# Patient Record
Sex: FEMALE | Race: OTHER | NOT HISPANIC OR LATINO
[De-identification: names, ages, dates, MRNs, and addresses within clinical notes are randomized per-mention and may not be internally consistent; named-entity substitution may affect disease eponyms.]

---

## 2017-01-26 ENCOUNTER — RESULT REVIEW (OUTPATIENT)
Age: 37
End: 2017-01-26

## 2018-06-29 ENCOUNTER — RESULT REVIEW (OUTPATIENT)
Age: 38
End: 2018-06-29

## 2019-01-28 RX ORDER — ERGOCALCIFEROL 1.25 MG/1
1 CAPSULE ORAL
Qty: 0 | Refills: 0 | COMMUNITY
Start: 2019-01-28

## 2019-01-29 ENCOUNTER — INPATIENT (INPATIENT)
Facility: HOSPITAL | Age: 39
LOS: 0 days | Discharge: HOME | End: 2019-01-30
Attending: HOSPITALIST | Admitting: HOSPITALIST
Payer: MEDICAID

## 2019-01-29 VITALS
TEMPERATURE: 98 F | OXYGEN SATURATION: 100 % | SYSTOLIC BLOOD PRESSURE: 111 MMHG | DIASTOLIC BLOOD PRESSURE: 63 MMHG | RESPIRATION RATE: 18 BRPM | HEART RATE: 88 BPM

## 2019-01-29 DIAGNOSIS — Z29.9 ENCOUNTER FOR PROPHYLACTIC MEASURES, UNSPECIFIED: ICD-10-CM

## 2019-01-29 DIAGNOSIS — R09.89 OTHER SPECIFIED SYMPTOMS AND SIGNS INVOLVING THE CIRCULATORY AND RESPIRATORY SYSTEMS: ICD-10-CM

## 2019-01-29 DIAGNOSIS — K59.00 CONSTIPATION, UNSPECIFIED: ICD-10-CM

## 2019-01-29 DIAGNOSIS — R05 COUGH: ICD-10-CM

## 2019-01-29 DIAGNOSIS — Z98.891 HISTORY OF UTERINE SCAR FROM PREVIOUS SURGERY: Chronic | ICD-10-CM

## 2019-01-29 DIAGNOSIS — M79.606 PAIN IN LEG, UNSPECIFIED: ICD-10-CM

## 2019-01-29 LAB
ALBUMIN SERPL ELPH-MCNC: 4 G/DL — SIGNIFICANT CHANGE UP (ref 3.3–5)
ALP SERPL-CCNC: 89 U/L — SIGNIFICANT CHANGE UP (ref 40–120)
ALT FLD-CCNC: 19 U/L — SIGNIFICANT CHANGE UP (ref 4–33)
ANION GAP SERPL CALC-SCNC: 15 MMO/L — HIGH (ref 7–14)
ANISOCYTOSIS BLD QL: SLIGHT — SIGNIFICANT CHANGE UP
APPEARANCE UR: CLEAR — SIGNIFICANT CHANGE UP
AST SERPL-CCNC: 16 U/L — SIGNIFICANT CHANGE UP (ref 4–32)
BASE EXCESS BLDV CALC-SCNC: 5 MMOL/L — SIGNIFICANT CHANGE UP
BASOPHILS # BLD AUTO: 0.04 K/UL — SIGNIFICANT CHANGE UP (ref 0–0.2)
BASOPHILS NFR BLD AUTO: 0.2 % — SIGNIFICANT CHANGE UP (ref 0–2)
BASOPHILS NFR SPEC: 1.7 % — SIGNIFICANT CHANGE UP (ref 0–2)
BILIRUB SERPL-MCNC: 0.6 MG/DL — SIGNIFICANT CHANGE UP (ref 0.2–1.2)
BILIRUB UR-MCNC: NEGATIVE — SIGNIFICANT CHANGE UP
BLASTS # FLD: 0 % — SIGNIFICANT CHANGE UP (ref 0–0)
BLOOD GAS VENOUS - CREATININE: 0.56 MG/DL — SIGNIFICANT CHANGE UP (ref 0.5–1.3)
BLOOD UR QL VISUAL: NEGATIVE — SIGNIFICANT CHANGE UP
BUN SERPL-MCNC: 12 MG/DL — SIGNIFICANT CHANGE UP (ref 7–23)
CALCIUM SERPL-MCNC: 8.9 MG/DL — SIGNIFICANT CHANGE UP (ref 8.4–10.5)
CHLORIDE BLDV-SCNC: 109 MMOL/L — HIGH (ref 96–108)
CHLORIDE SERPL-SCNC: 101 MMOL/L — SIGNIFICANT CHANGE UP (ref 98–107)
CK SERPL-CCNC: 47 U/L — SIGNIFICANT CHANGE UP (ref 25–170)
CO2 SERPL-SCNC: 25 MMOL/L — SIGNIFICANT CHANGE UP (ref 22–31)
COLOR SPEC: SIGNIFICANT CHANGE UP
CREAT SERPL-MCNC: 0.75 MG/DL — SIGNIFICANT CHANGE UP (ref 0.5–1.3)
CRP SERPL-MCNC: < 4 MG/L — SIGNIFICANT CHANGE UP
EOSINOPHIL # BLD AUTO: 0.09 K/UL — SIGNIFICANT CHANGE UP (ref 0–0.5)
EOSINOPHIL NFR BLD AUTO: 0.6 % — SIGNIFICANT CHANGE UP (ref 0–6)
EOSINOPHIL NFR FLD: 0 % — SIGNIFICANT CHANGE UP (ref 0–6)
ERYTHROCYTE [SEDIMENTATION RATE] IN BLOOD: 14 MM/HR — SIGNIFICANT CHANGE UP (ref 4–25)
ERYTHROCYTE [SEDIMENTATION RATE] IN BLOOD: SIGNIFICANT CHANGE UP MM/HR (ref 4–25)
GAS PNL BLDV: 136 MMOL/L — SIGNIFICANT CHANGE UP (ref 136–146)
GLUCOSE BLDV-MCNC: 85 — SIGNIFICANT CHANGE UP (ref 70–99)
GLUCOSE SERPL-MCNC: 79 MG/DL — SIGNIFICANT CHANGE UP (ref 70–99)
GLUCOSE UR-MCNC: NEGATIVE — SIGNIFICANT CHANGE UP
HCG SERPL-ACNC: < 5 MIU/ML — SIGNIFICANT CHANGE UP
HCO3 BLDV-SCNC: 27 MMOL/L — SIGNIFICANT CHANGE UP (ref 20–27)
HCT VFR BLD CALC: 36.1 % — SIGNIFICANT CHANGE UP (ref 34.5–45)
HCT VFR BLDV CALC: 35 % — SIGNIFICANT CHANGE UP (ref 34.5–45)
HGB BLD-MCNC: 11.8 G/DL — SIGNIFICANT CHANGE UP (ref 11.5–15.5)
HGB BLDV-MCNC: 11.4 G/DL — LOW (ref 11.5–15.5)
HYPOCHROMIA BLD QL: SLIGHT — SIGNIFICANT CHANGE UP
IMM GRANULOCYTES NFR BLD AUTO: 0.7 % — SIGNIFICANT CHANGE UP (ref 0–1.5)
KETONES UR-MCNC: NEGATIVE — SIGNIFICANT CHANGE UP
LACTATE BLDV-MCNC: 2.1 MMOL/L — HIGH (ref 0.5–2)
LEUKOCYTE ESTERASE UR-ACNC: NEGATIVE — SIGNIFICANT CHANGE UP
LYMPHOCYTES # BLD AUTO: 43.4 % — SIGNIFICANT CHANGE UP (ref 13–44)
LYMPHOCYTES # BLD AUTO: 7.03 K/UL — HIGH (ref 1–3.3)
LYMPHOCYTES NFR SPEC AUTO: 34.5 % — SIGNIFICANT CHANGE UP (ref 13–44)
MCHC RBC-ENTMCNC: 28.1 PG — SIGNIFICANT CHANGE UP (ref 27–34)
MCHC RBC-ENTMCNC: 32.7 % — SIGNIFICANT CHANGE UP (ref 32–36)
MCV RBC AUTO: 86 FL — SIGNIFICANT CHANGE UP (ref 80–100)
METAMYELOCYTES # FLD: 0 % — SIGNIFICANT CHANGE UP (ref 0–1)
MONOCYTES # BLD AUTO: 1.34 K/UL — HIGH (ref 0–0.9)
MONOCYTES NFR BLD AUTO: 8.3 % — SIGNIFICANT CHANGE UP (ref 2–14)
MONOCYTES NFR BLD: 8.6 % — SIGNIFICANT CHANGE UP (ref 2–9)
MYELOCYTES NFR BLD: 0 % — SIGNIFICANT CHANGE UP (ref 0–0)
NEUTROPHIL AB SER-ACNC: 51.7 % — SIGNIFICANT CHANGE UP (ref 43–77)
NEUTROPHILS # BLD AUTO: 7.56 K/UL — HIGH (ref 1.8–7.4)
NEUTROPHILS NFR BLD AUTO: 46.8 % — SIGNIFICANT CHANGE UP (ref 43–77)
NEUTS BAND # BLD: 0 % — SIGNIFICANT CHANGE UP (ref 0–6)
NITRITE UR-MCNC: NEGATIVE — SIGNIFICANT CHANGE UP
NRBC # FLD: 0 K/UL — LOW (ref 25–125)
OTHER - HEMATOLOGY %: 0 — SIGNIFICANT CHANGE UP
PCO2 BLDV: 55 MMHG — HIGH (ref 41–51)
PH BLDV: 7.36 PH — SIGNIFICANT CHANGE UP (ref 7.32–7.43)
PH UR: 6 — SIGNIFICANT CHANGE UP (ref 5–8)
PLATELET # BLD AUTO: 400 K/UL — SIGNIFICANT CHANGE UP (ref 150–400)
PLATELET COUNT - ESTIMATE: NORMAL — SIGNIFICANT CHANGE UP
PMV BLD: 9.4 FL — SIGNIFICANT CHANGE UP (ref 7–13)
PO2 BLDV: 31 MMHG — LOW (ref 35–40)
POLYCHROMASIA BLD QL SMEAR: SLIGHT — SIGNIFICANT CHANGE UP
POTASSIUM BLDV-SCNC: 3.5 MMOL/L — SIGNIFICANT CHANGE UP (ref 3.4–4.5)
POTASSIUM SERPL-MCNC: 3.8 MMOL/L — SIGNIFICANT CHANGE UP (ref 3.5–5.3)
POTASSIUM SERPL-SCNC: 3.8 MMOL/L — SIGNIFICANT CHANGE UP (ref 3.5–5.3)
PROMYELOCYTES # FLD: 0 % — SIGNIFICANT CHANGE UP (ref 0–0)
PROT SERPL-MCNC: 7 G/DL — SIGNIFICANT CHANGE UP (ref 6–8.3)
PROT UR-MCNC: NEGATIVE — SIGNIFICANT CHANGE UP
RBC # BLD: 4.2 M/UL — SIGNIFICANT CHANGE UP (ref 3.8–5.2)
RBC # FLD: 13.6 % — SIGNIFICANT CHANGE UP (ref 10.3–14.5)
SAO2 % BLDV: 53 % — LOW (ref 60–85)
SODIUM SERPL-SCNC: 141 MMOL/L — SIGNIFICANT CHANGE UP (ref 135–145)
SP GR SPEC: 1.02 — SIGNIFICANT CHANGE UP (ref 1–1.04)
TSH SERPL-MCNC: 2.3 UIU/ML — SIGNIFICANT CHANGE UP (ref 0.27–4.2)
UROBILINOGEN FLD QL: NORMAL — SIGNIFICANT CHANGE UP
VARIANT LYMPHS # BLD: 3.5 % — SIGNIFICANT CHANGE UP
WBC # BLD: 16.18 K/UL — HIGH (ref 3.8–10.5)
WBC # FLD AUTO: 16.18 K/UL — HIGH (ref 3.8–10.5)

## 2019-01-29 PROCEDURE — 71045 X-RAY EXAM CHEST 1 VIEW: CPT | Mod: 26

## 2019-01-29 PROCEDURE — 99223 1ST HOSP IP/OBS HIGH 75: CPT

## 2019-01-29 RX ORDER — MORPHINE SULFATE 50 MG/1
4 CAPSULE, EXTENDED RELEASE ORAL ONCE
Qty: 0 | Refills: 0 | Status: DISCONTINUED | OUTPATIENT
Start: 2019-01-29 | End: 2019-01-29

## 2019-01-29 RX ORDER — SENNA PLUS 8.6 MG/1
2 TABLET ORAL ONCE
Qty: 0 | Refills: 0 | Status: COMPLETED | OUTPATIENT
Start: 2019-01-29 | End: 2019-01-29

## 2019-01-29 RX ORDER — DOCUSATE SODIUM 100 MG
100 CAPSULE ORAL ONCE
Qty: 0 | Refills: 0 | Status: COMPLETED | OUTPATIENT
Start: 2019-01-29 | End: 2019-01-29

## 2019-01-29 RX ORDER — SODIUM CHLORIDE 9 MG/ML
1000 INJECTION INTRAMUSCULAR; INTRAVENOUS; SUBCUTANEOUS
Qty: 0 | Refills: 0 | Status: DISCONTINUED | OUTPATIENT
Start: 2019-01-29 | End: 2019-01-30

## 2019-01-29 RX ORDER — LACTOBACILLUS ACIDOPHILUS 100MM CELL
1 CAPSULE ORAL EVERY 12 HOURS
Qty: 0 | Refills: 0 | Status: DISCONTINUED | OUTPATIENT
Start: 2019-01-29 | End: 2019-01-30

## 2019-01-29 RX ORDER — ENOXAPARIN SODIUM 100 MG/ML
40 INJECTION SUBCUTANEOUS DAILY
Qty: 0 | Refills: 0 | Status: DISCONTINUED | OUTPATIENT
Start: 2019-01-30 | End: 2019-01-30

## 2019-01-29 RX ORDER — SENNA PLUS 8.6 MG/1
2 TABLET ORAL AT BEDTIME
Qty: 0 | Refills: 0 | Status: DISCONTINUED | OUTPATIENT
Start: 2019-01-30 | End: 2019-01-30

## 2019-01-29 RX ORDER — MONTELUKAST 4 MG/1
1 TABLET, CHEWABLE ORAL
Qty: 0 | Refills: 0 | COMMUNITY

## 2019-01-29 RX ORDER — MORPHINE SULFATE 50 MG/1
4 CAPSULE, EXTENDED RELEASE ORAL EVERY 6 HOURS
Qty: 0 | Refills: 0 | Status: DISCONTINUED | OUTPATIENT
Start: 2019-01-29 | End: 2019-01-30

## 2019-01-29 RX ORDER — KETOROLAC TROMETHAMINE 30 MG/ML
15 SYRINGE (ML) INJECTION ONCE
Qty: 0 | Refills: 0 | Status: DISCONTINUED | OUTPATIENT
Start: 2019-01-29 | End: 2019-01-29

## 2019-01-29 RX ORDER — SODIUM CHLORIDE 9 MG/ML
1000 INJECTION INTRAMUSCULAR; INTRAVENOUS; SUBCUTANEOUS ONCE
Qty: 0 | Refills: 0 | Status: COMPLETED | OUTPATIENT
Start: 2019-01-29 | End: 2019-01-29

## 2019-01-29 RX ORDER — PANTOPRAZOLE SODIUM 20 MG/1
40 TABLET, DELAYED RELEASE ORAL ONCE
Qty: 0 | Refills: 0 | Status: COMPLETED | OUTPATIENT
Start: 2019-01-29 | End: 2019-01-29

## 2019-01-29 RX ORDER — DOCUSATE SODIUM 100 MG
100 CAPSULE ORAL THREE TIMES A DAY
Qty: 0 | Refills: 0 | Status: DISCONTINUED | OUTPATIENT
Start: 2019-01-30 | End: 2019-01-30

## 2019-01-29 RX ADMIN — MORPHINE SULFATE 4 MILLIGRAM(S): 50 CAPSULE, EXTENDED RELEASE ORAL at 18:37

## 2019-01-29 RX ADMIN — MORPHINE SULFATE 4 MILLIGRAM(S): 50 CAPSULE, EXTENDED RELEASE ORAL at 22:31

## 2019-01-29 RX ADMIN — MORPHINE SULFATE 4 MILLIGRAM(S): 50 CAPSULE, EXTENDED RELEASE ORAL at 18:08

## 2019-01-29 RX ADMIN — Medication 15 MILLIGRAM(S): at 16:39

## 2019-01-29 RX ADMIN — MORPHINE SULFATE 4 MILLIGRAM(S): 50 CAPSULE, EXTENDED RELEASE ORAL at 23:34

## 2019-01-29 RX ADMIN — SODIUM CHLORIDE 1000 MILLILITER(S): 9 INJECTION INTRAMUSCULAR; INTRAVENOUS; SUBCUTANEOUS at 19:29

## 2019-01-29 RX ADMIN — Medication 15 MILLIGRAM(S): at 18:37

## 2019-01-29 RX ADMIN — SODIUM CHLORIDE 75 MILLILITER(S): 9 INJECTION INTRAMUSCULAR; INTRAVENOUS; SUBCUTANEOUS at 22:31

## 2019-01-29 RX ADMIN — Medication 100 MILLIGRAM(S): at 23:34

## 2019-01-29 RX ADMIN — SODIUM CHLORIDE 1000 MILLILITER(S): 9 INJECTION INTRAMUSCULAR; INTRAVENOUS; SUBCUTANEOUS at 16:04

## 2019-01-29 RX ADMIN — SENNA PLUS 2 TABLET(S): 8.6 TABLET ORAL at 23:34

## 2019-01-29 RX ADMIN — PANTOPRAZOLE SODIUM 40 MILLIGRAM(S): 20 TABLET, DELAYED RELEASE ORAL at 22:15

## 2019-01-29 NOTE — ED PROVIDER NOTE - PROGRESS NOTE DETAILS
FRANCISCO Bedolla called for admission. EARL left with answering service. Awaiting callback. Spoke to hospitalist and due to inability to ambulate and pain control will admit to hospitalist Dr. Evans after inability to reach Dr. Bedolla x 2. Text paged MAR.   -Spencer Esposito PGY4 [facility]#[id number]

## 2019-01-29 NOTE — H&P ADULT - HISTORY OF PRESENT ILLNESS
37 y/o Female , + Obesity,   presenting with B/L  legs pain x yesterday. Pt states 2 weeks ago she developed cough with fever, was seen by her PMD and prescribed a prednisone taper and Tamiflu x 5 days. Pt stopped the taper as she was feeling better but then symptoms returned and she was re-started on prednisone (unknown amount), pt states she is now taking one tablet twice daily. Pt states last night she developed left thigh pain with radiation down her leg, a few hours later the pain was also in the right leg. Pt states is unable to ambulate without assistance and that she intermittently feels sharp pain in the neck and back. She reports a tingling sensation to her feet and lower legs. Pt states she no longer has fever but continues to have dry cough. Pt denies chills, abdominal pain, dizziness, chest pain, shortness of breath, difficulty breathing, recent travel or any other concerns. Pt C/O + HA, + N/V with cough, + Constipation, No diarrhea, Last BM today, NO SOB, no rash, no abdominal pain, no dysuria, no sore throat, No recent travel, every one is sick at home with Cold/URI symptoms, + fatigue, + weakness, Cough is DRY, + Leukocytosis as per Lab, RVP & chest X ray ordered by me pending, Pt S/P Toradol  15 mg IVP x 1 and Morphine 4 mg IVP x 2 tonight for pain, LMP 3 weeks ago, denies pregnancy, pt awake, A+O x 3, 37 y/o Female , + Obesity,   presenting with B/L  legs pain x yesterday. Pt states 2 weeks ago she developed cough with fever, was seen by her PMD and prescribed a prednisone taper and Tamiflu x 5 days. Pt stopped the taper as she was feeling better but then symptoms returned and she was re-started on prednisone (unknown amount), pt states she is now taking one tablet twice daily. Pt states last night she developed left thigh pain with radiation down her leg, a few hours later the pain was also in the right leg. Pt states is unable to ambulate without assistance and that she intermittently feels sharp pain in the neck and back. She reports a tingling sensation to her feet and lower legs. Pt states she no longer has fever but continues to have dry cough. Pt denies chills, abdominal pain, dizziness, chest pain, shortness of breath, difficulty breathing, recent travel or any other concerns. Pt C/O + HA, + N/V with cough, + Constipation, No diarrhea, Last BM today, NO SOB, no rash, no abdominal pain, no dysuria, no sore throat, No recent travel, every one is sick at home with Cold/URI symptoms, + fatigue, + weakness, Cough is DRY, + Leukocytosis as per Lab, RVP & chest X ray ordered by me pending, Pt S/P Toradol  15 mg IVP x 1 and Morphine 4 mg IVP x 2 tonight for pain, LMP 3 weeks ago, denies pregnancy, pt awake, A+O x 3,     Labs: ESR 14, Lactate 2.1, Na 141, K+ 3.8, BUN 12, Creatinine 0.75, Glucose 79, LFT Normal, CPK 47, HCG < 5, TSH 2.3, UA Neg., WBC 16.18, Hgb 11.8, platelet 400, CRP < 4,     Vitals: Tem 99.8 (Rectal), HR 93, /55, RR 17, 100% RA

## 2019-01-29 NOTE — ED PROVIDER NOTE - ATTENDING CONTRIBUTION TO CARE
ED Attending (Dr Hollingsworth): I have personally performed a face to face bedside history and physical examination of this patient.  I have discussed the case with the PA and  I have personally authored the following components: HPI, ROS, Physical Exam and MDM in addition to reviewing and revising the rest of the Provider Note. 38yF w/non contirb pmhx p/w severe BL thigh pain and tenderness. Pt is unable to ambulate or stand up without support on exam. non diabetic, but concern for myositis vs rhabdo.  Unlikely demyelinating process as full sensation and strength at present. Presentatino would be very atypical for radicular pain given severe mm tenderness, but would consider on ddx. Neuro intact. Will check cbc/cmp/ck/vbg, ua and culture. Will give fluids, analgesia and reassess. c/w assessment in Main ED.

## 2019-01-29 NOTE — ED PROVIDER NOTE - OBJECTIVE STATEMENT
38yF w/no pmhx presenting with b/l leg pain x yesterday. Pt states 2 weeks ago she developed cough with fever, was seen by her PMD and prescribed a prednisone taper. Pt stopped the taper as she was feeling better but then symptoms returned and she was re-started on prednisone (unknown amount), pt states she is now taking one tablet twice daily. Pt states last night she developed left thigh pain with radiation down her leg, a few hours later the pain was also in the left leg. 38yF w/no pmhx presenting with b/l leg pain x yesterday. Pt states 2 weeks ago she developed cough with fever, was seen by her PMD and prescribed a prednisone taper. Pt stopped the taper as she was feeling better but then symptoms returned and she was re-started on prednisone (unknown amount), pt states she is now taking one tablet twice daily. Pt states last night she developed left thigh pain with radiation down her leg, a few hours later the pain was also in the right leg. Pt states is unable to ambulate without assistance and that she intermittently feels sharp pain in the neck and back. She reports a tingling sensation to her feet and lower legs. Pt states she no longer has fever but continues to have dry cough. Pt denies chills, abdominal pain, n/v/d, headache, dizziness, chest pain, shortness of breath, difficulty breathing, recent travel or any other concerns. 38yF w/no known pmhx presenting with b/l leg pain starting yesterday. Pt states 2 weeks ago she developed cough with fever, was seen by her PMD and prescribed a prednisone taper and another medication. Pt stopped the taper as she was feeling better but then symptoms returned and she was re-started on prednisone (unknown amount), pt states she is now taking one tablet twice daily. Pt states last night she developed left thigh pain with radiation down her leg, a few hours later the pain was also in the right leg. Pt states is unable to ambulate without assistance and that she intermittently feels sharp pain in the neck and back. She reports a tingling sensation to her feet and lower legs. Pt states she no longer has fever but continues to have dry cough. Pt denies chills, abdominal pain, n/v/d, headache, dizziness, chest pain, shortness of breath, difficulty breathing, recent travel or any other concerns.  Uncomfortable appearing.    Initially evaluated in surge area, to be transferred to Main ED, verbal handoff provided by Dr. Hollingsworth to Dr. Kamara.

## 2019-01-29 NOTE — H&P ADULT - PROBLEM SELECTOR PLAN 1
R/O viral syndrome, RVP, B/L Venous Doppler legs, R/O DVT,   F/U CBC, CMP, Iron studies, ferritin,  Vit. B12, Folate,  Pain Control, IV Morphine PRN, colace, Senna, IVF NS @ 75 cc/hr x 24 HR, Bacid,   PT Consult,  Fall/aspiration precaution,

## 2019-01-29 NOTE — ED ADULT NURSE REASSESSMENT NOTE - NS ED NURSE REASSESS COMMENT FT1
report received from Ezio RN, pt A&Ox3, complaining of 10/10 pain in both thighs that shoots down to both R and L leg since 10 pm last night. pt has strong flexion and extension bilaterally, has an increase in pain when lifting legs, on prednisone for flu. pt denies SOB, CP, abd pain, n/v/d, numbness, tingling, syncope, dysuria, no difference in eating/drinking. pt states she is unable to ambulate due to pain. VS stable, will continue to monitor. report received from Ezio RN, pt A&Ox3, complaining of 10/10 pain in both thighs that shoots down to both R and L leg since 10 pm last night. pt has strong flexion and extension bilaterally, has an increase in pain when lifting legs, on prednisone for flu. pt denies SOB, CP, abd pain, n/v/d, numbness, tingling, syncope, dysuria, no difference in eating/drinking. pt states she is unable to ambulate due to pain, can normally ambulate. VS stable, will continue to monitor.

## 2019-01-29 NOTE — ED ADULT NURSE REASSESSMENT NOTE - NS ED NURSE REASSESS COMMENT FT1
PT A&Ox4, received from [facility] area with 22 G IV to R ARM and fluids infusing. Originally presented to ED with bilateral back pain radiating down bilateral legs, difficulty ambulating and unsteady gait. Pt states she was DX with flu started on prednisone taper symptoms in legs started last night is ambulatory at baseline but unable to ambulate without assistance today. Pt medicated as ordered, VS as noted. Call bell within reach, will continue to monitor for safety and comfort.

## 2019-01-29 NOTE — H&P ADULT - PROBLEM SELECTOR PLAN 2
Chest X ray, RVP, Blood Cultures, sputum culture, Urine Legionella,  F/U CBC, CMP, + Leukocytosis, pt has been on PO Prednisone at home,   R/O Viral syndrome, R/O Pneumonia, HIV Test,

## 2019-01-29 NOTE — ED ADULT TRIAGE NOTE - CHIEF COMPLAINT QUOTE
Pt c/o B/L leg pains, described as "shooting pains" since last night. Unable to ambulate due to pain. Pt was dx with flu and sent home promethazine and prednisone. States B/L legs are tingling and weak. Denies pmhx.

## 2019-01-29 NOTE — H&P ADULT - ASSESSMENT
37 y/o Female , + Obesity,   presenting with B/L  legs pain x yesterday. Pt states 2 weeks ago she developed cough with fever, was seen by her PMD and prescribed a prednisone taper and Tamiflu x 5 days. Pt stopped the taper as she was feeling better but then symptoms returned and she was re-started on prednisone (unknown amount), pt states she is now taking one tablet twice daily. Pt states last night she developed left thigh pain with radiation down her leg, a few hours later the pain was also in the right leg. Pt states is unable to ambulate without assistance and that she intermittently feels sharp pain in the neck and back. She reports a tingling sensation to her feet and lower legs. Pt states she no longer has fever but continues to have dry cough. Pt denies chills, abdominal pain, dizziness, chest pain, shortness of breath, difficulty breathing, recent travel or any other concerns. Pt C/O + HA, + N/V with cough, + Constipation, No diarrhea, Last BM today, NO SOB, no rash, no abdominal pain, no dysuria, no sore throat, No recent travel, every one is sick at home with Cold/URI symptoms, + fatigue, + weakness, Cough is DRY, + Leukocytosis as per Lab, RVP & chest X ray ordered by me pending, Pt S/P Toradol  15 mg IVP x 1 and Morphine 4 mg IVP x 2 tonight for pain, LMP 3 weeks ago, denies pregnancy, pt awake, A+O x 3,

## 2019-01-29 NOTE — ED PROVIDER NOTE - MEDICAL DECISION MAKING DETAILS
38yF w/no pmhx p/w b/l leg pain that began last night in the setting of prednisone use for recent cold like symptoms. Pt is unable to ambulate or stand up without support on exam. Concern for myositis vs body aches from viral syndrome. Will check cbc/cmp/ck/vbg, ua and culture. Will give fluids, analgesia and reassess. 38yF w/non contirb pmhx p/w severe BL thigh pain and tenderness. Pt is unable to ambulate or stand up without support on exam. non diabetic, but concern for myositis vs rhabdo.  Unlikely demyelinating process as full sensation and strength at present. Presentatino would be very atypical for radicular pain given severe mm tenderness, but would consider on ddx. Neuro intact. Will check cbc/cmp/ck/vbg, ua and culture. Will give fluids, analgesia and reassess. c/w assessment in Main ED.

## 2019-01-29 NOTE — H&P ADULT - PROBLEM SELECTOR PLAN 4
DVT Prophylaxis: SQ Lovenox, PT, PTT, INR,    Hep A,B,C  profile,   PT consult, DVT Prophylaxis: SQ Lovenox, PT, PTT, INR,    Hep A,B,C  profile, Fasting lipid, HgbA1c,   PT consult,

## 2019-01-29 NOTE — ED PROVIDER NOTE - PHYSICAL EXAMINATION
pt appears in mild distress due to pain, leaning onto edge of exam table  MSK: +TTP along thighs b/l, non-tender calfs, limited ROM of lower extremities 2/2 pain, s  Neuro: A&Ox3, CN II-VII intact, sensation intact and equal b/l in all extremities pt appears in moderate distress due to pain, leaning onto edge of exam table  MSK: +TTP at both thighs b/l, even with mild contact, non-tender calves and joints, limited active ROM of lower extremities 2/2 pain, full passive ROM at all joints. Mild TTP C and L spine.   Neuro: A&Ox3, CN II-VII intact, sensation intact and equal b/l in all extremities, strength appears intact but effort limited by pain.     Can stand assisted, but feels severe pain in both thighs, no change in back pain - which is intermittent and mild. Unable to get up from chair on her own.

## 2019-01-29 NOTE — ED ADULT NURSE NOTE - OBJECTIVE STATEMENT
Pt presents to [facility], room 4, A&OX3, ambulatory at baseline without assistance, here for evaluation of bilateral leg pain that started last night. pt was started on a prednisone taper two weeks ago after seeing her pmd for sob/cold symptoms. Denies any dizziness, nausea, vomiting, shortness of breath, palpitations, diarrhea, fever, constipation, or chills. no falls or trauma. no bowel or bladder incontinence . IV established in right forearm with a 22g, labs drawn and sent, call bell in reach, side rails up, bed in locked position, md evaluation in progress, will continue to monitor.

## 2019-01-30 ENCOUNTER — TRANSCRIPTION ENCOUNTER (OUTPATIENT)
Age: 39
End: 2019-01-30

## 2019-01-30 VITALS
RESPIRATION RATE: 18 BRPM | TEMPERATURE: 99 F | SYSTOLIC BLOOD PRESSURE: 120 MMHG | HEART RATE: 74 BPM | OXYGEN SATURATION: 99 % | DIASTOLIC BLOOD PRESSURE: 65 MMHG

## 2019-01-30 DIAGNOSIS — E78.1 PURE HYPERGLYCERIDEMIA: ICD-10-CM

## 2019-01-30 LAB
ALBUMIN SERPL ELPH-MCNC: 3.6 G/DL — SIGNIFICANT CHANGE UP (ref 3.3–5)
ALP SERPL-CCNC: 82 U/L — SIGNIFICANT CHANGE UP (ref 40–120)
ALT FLD-CCNC: 16 U/L — SIGNIFICANT CHANGE UP (ref 4–33)
ANION GAP SERPL CALC-SCNC: 10 MMO/L — SIGNIFICANT CHANGE UP (ref 7–14)
AST SERPL-CCNC: 13 U/L — SIGNIFICANT CHANGE UP (ref 4–32)
B PERT DNA SPEC QL NAA+PROBE: NOT DETECTED — SIGNIFICANT CHANGE UP
BASOPHILS # BLD AUTO: 0.01 K/UL — SIGNIFICANT CHANGE UP (ref 0–0.2)
BASOPHILS NFR BLD AUTO: 0.1 % — SIGNIFICANT CHANGE UP (ref 0–2)
BILIRUB SERPL-MCNC: 0.8 MG/DL — SIGNIFICANT CHANGE UP (ref 0.2–1.2)
BUN SERPL-MCNC: 12 MG/DL — SIGNIFICANT CHANGE UP (ref 7–23)
C PNEUM DNA SPEC QL NAA+PROBE: NOT DETECTED — SIGNIFICANT CHANGE UP
CALCIUM SERPL-MCNC: 8.5 MG/DL — SIGNIFICANT CHANGE UP (ref 8.4–10.5)
CHLORIDE SERPL-SCNC: 102 MMOL/L — SIGNIFICANT CHANGE UP (ref 98–107)
CHOLEST SERPL-MCNC: 153 MG/DL — SIGNIFICANT CHANGE UP (ref 120–199)
CO2 SERPL-SCNC: 25 MMOL/L — SIGNIFICANT CHANGE UP (ref 22–31)
CREAT SERPL-MCNC: 0.68 MG/DL — SIGNIFICANT CHANGE UP (ref 0.5–1.3)
EOSINOPHIL # BLD AUTO: 0.19 K/UL — SIGNIFICANT CHANGE UP (ref 0–0.5)
EOSINOPHIL NFR BLD AUTO: 1.6 % — SIGNIFICANT CHANGE UP (ref 0–6)
FERRITIN SERPL-MCNC: 63.84 NG/ML — SIGNIFICANT CHANGE UP (ref 15–150)
FLUAV H1 2009 PAND RNA SPEC QL NAA+PROBE: NOT DETECTED — SIGNIFICANT CHANGE UP
FLUAV H1 RNA SPEC QL NAA+PROBE: NOT DETECTED — SIGNIFICANT CHANGE UP
FLUAV H3 RNA SPEC QL NAA+PROBE: NOT DETECTED — SIGNIFICANT CHANGE UP
FLUAV SUBTYP SPEC NAA+PROBE: NOT DETECTED — SIGNIFICANT CHANGE UP
FLUBV RNA SPEC QL NAA+PROBE: NOT DETECTED — SIGNIFICANT CHANGE UP
FOLATE SERPL-MCNC: 7.2 NG/ML — SIGNIFICANT CHANGE UP (ref 4.7–20)
GLUCOSE SERPL-MCNC: 99 MG/DL — SIGNIFICANT CHANGE UP (ref 70–99)
HADV DNA SPEC QL NAA+PROBE: NOT DETECTED — SIGNIFICANT CHANGE UP
HAV IGM SER-ACNC: NONREACTIVE — SIGNIFICANT CHANGE UP
HBA1C BLD-MCNC: 5.5 % — SIGNIFICANT CHANGE UP (ref 4–5.6)
HBV CORE IGM SER-ACNC: NONREACTIVE — SIGNIFICANT CHANGE UP
HBV SURFACE AG SER-ACNC: NONREACTIVE — SIGNIFICANT CHANGE UP
HCOV PNL SPEC NAA+PROBE: SIGNIFICANT CHANGE UP
HCT VFR BLD CALC: 34.6 % — SIGNIFICANT CHANGE UP (ref 34.5–45)
HCV AB S/CO SERPL IA: 0.1 S/CO — SIGNIFICANT CHANGE UP
HCV AB SERPL-IMP: SIGNIFICANT CHANGE UP
HDLC SERPL-MCNC: 36 MG/DL — LOW (ref 45–65)
HGB BLD-MCNC: 10.9 G/DL — LOW (ref 11.5–15.5)
HMPV RNA SPEC QL NAA+PROBE: NOT DETECTED — SIGNIFICANT CHANGE UP
HPIV1 RNA SPEC QL NAA+PROBE: NOT DETECTED — SIGNIFICANT CHANGE UP
HPIV2 RNA SPEC QL NAA+PROBE: NOT DETECTED — SIGNIFICANT CHANGE UP
HPIV3 RNA SPEC QL NAA+PROBE: NOT DETECTED — SIGNIFICANT CHANGE UP
HPIV4 RNA SPEC QL NAA+PROBE: NOT DETECTED — SIGNIFICANT CHANGE UP
IMM GRANULOCYTES NFR BLD AUTO: 0.5 % — SIGNIFICANT CHANGE UP (ref 0–1.5)
IRON SATN MFR SERPL: 310 UG/DL — SIGNIFICANT CHANGE UP (ref 140–530)
IRON SATN MFR SERPL: 84 UG/DL — SIGNIFICANT CHANGE UP (ref 30–160)
LIPID PNL WITH DIRECT LDL SERPL: 84 MG/DL — SIGNIFICANT CHANGE UP
LYMPHOCYTES # BLD AUTO: 44.3 % — HIGH (ref 13–44)
LYMPHOCYTES # BLD AUTO: 5.41 K/UL — HIGH (ref 1–3.3)
MAGNESIUM SERPL-MCNC: 2.1 MG/DL — SIGNIFICANT CHANGE UP (ref 1.6–2.6)
MCHC RBC-ENTMCNC: 27.9 PG — SIGNIFICANT CHANGE UP (ref 27–34)
MCHC RBC-ENTMCNC: 31.5 % — LOW (ref 32–36)
MCV RBC AUTO: 88.5 FL — SIGNIFICANT CHANGE UP (ref 80–100)
MONOCYTES # BLD AUTO: 0.81 K/UL — SIGNIFICANT CHANGE UP (ref 0–0.9)
MONOCYTES NFR BLD AUTO: 6.6 % — SIGNIFICANT CHANGE UP (ref 2–14)
NEUTROPHILS # BLD AUTO: 5.73 K/UL — SIGNIFICANT CHANGE UP (ref 1.8–7.4)
NEUTROPHILS NFR BLD AUTO: 46.9 % — SIGNIFICANT CHANGE UP (ref 43–77)
NRBC # FLD: 0 K/UL — LOW (ref 25–125)
PHOSPHATE SERPL-MCNC: 3.1 MG/DL — SIGNIFICANT CHANGE UP (ref 2.5–4.5)
PLATELET # BLD AUTO: 332 K/UL — SIGNIFICANT CHANGE UP (ref 150–400)
PMV BLD: 9.4 FL — SIGNIFICANT CHANGE UP (ref 7–13)
POTASSIUM SERPL-MCNC: 4.4 MMOL/L — SIGNIFICANT CHANGE UP (ref 3.5–5.3)
POTASSIUM SERPL-SCNC: 4.4 MMOL/L — SIGNIFICANT CHANGE UP (ref 3.5–5.3)
PROT SERPL-MCNC: 6 G/DL — SIGNIFICANT CHANGE UP (ref 6–8.3)
RBC # BLD: 3.91 M/UL — SIGNIFICANT CHANGE UP (ref 3.8–5.2)
RBC # FLD: 13.9 % — SIGNIFICANT CHANGE UP (ref 10.3–14.5)
RSV RNA SPEC QL NAA+PROBE: NOT DETECTED — SIGNIFICANT CHANGE UP
RV+EV RNA SPEC QL NAA+PROBE: NOT DETECTED — SIGNIFICANT CHANGE UP
SODIUM SERPL-SCNC: 137 MMOL/L — SIGNIFICANT CHANGE UP (ref 135–145)
TRIGL SERPL-MCNC: 340 MG/DL — HIGH (ref 10–149)
UIBC SERPL-MCNC: 226.1 UG/DL — SIGNIFICANT CHANGE UP (ref 110–370)
VIT B12 SERPL-MCNC: 150 PG/ML — LOW (ref 200–900)
WBC # BLD: 12.21 K/UL — HIGH (ref 3.8–10.5)
WBC # FLD AUTO: 12.21 K/UL — HIGH (ref 3.8–10.5)

## 2019-01-30 PROCEDURE — 99239 HOSP IP/OBS DSCHRG MGMT >30: CPT

## 2019-01-30 PROCEDURE — 93970 EXTREMITY STUDY: CPT | Mod: 26

## 2019-01-30 RX ORDER — GABAPENTIN 400 MG/1
1 CAPSULE ORAL
Qty: 90 | Refills: 0 | OUTPATIENT
Start: 2019-01-30 | End: 2019-02-28

## 2019-01-30 RX ORDER — INFLUENZA VIRUS VACCINE 15; 15; 15; 15 UG/.5ML; UG/.5ML; UG/.5ML; UG/.5ML
0.5 SUSPENSION INTRAMUSCULAR ONCE
Qty: 0 | Refills: 0 | Status: DISCONTINUED | OUTPATIENT
Start: 2019-01-30 | End: 2019-01-30

## 2019-01-30 RX ORDER — KETOROLAC TROMETHAMINE 30 MG/ML
15 SYRINGE (ML) INJECTION ONCE
Qty: 0 | Refills: 0 | Status: DISCONTINUED | OUTPATIENT
Start: 2019-01-30 | End: 2019-01-30

## 2019-01-30 RX ORDER — GABAPENTIN 400 MG/1
100 CAPSULE ORAL THREE TIMES A DAY
Qty: 0 | Refills: 0 | Status: DISCONTINUED | OUTPATIENT
Start: 2019-01-30 | End: 2019-01-30

## 2019-01-30 RX ORDER — CETIRIZINE HYDROCHLORIDE 10 MG/1
1 TABLET ORAL
Qty: 0 | Refills: 0 | COMMUNITY

## 2019-01-30 RX ORDER — IBUPROFEN 200 MG
1 TABLET ORAL
Qty: 0 | Refills: 0 | COMMUNITY
Start: 2019-01-30

## 2019-01-30 RX ORDER — DOCUSATE SODIUM 100 MG
1 CAPSULE ORAL
Qty: 0 | Refills: 0 | COMMUNITY
Start: 2019-01-30

## 2019-01-30 RX ORDER — IBUPROFEN 200 MG
600 TABLET ORAL EVERY 6 HOURS
Qty: 0 | Refills: 0 | Status: DISCONTINUED | OUTPATIENT
Start: 2019-01-30 | End: 2019-01-30

## 2019-01-30 RX ORDER — SENNA PLUS 8.6 MG/1
2 TABLET ORAL
Qty: 0 | Refills: 0 | COMMUNITY
Start: 2019-01-30

## 2019-01-30 RX ADMIN — Medication 15 MILLIGRAM(S): at 06:27

## 2019-01-30 RX ADMIN — Medication 1 TABLET(S): at 06:27

## 2019-01-30 RX ADMIN — ENOXAPARIN SODIUM 40 MILLIGRAM(S): 100 INJECTION SUBCUTANEOUS at 11:56

## 2019-01-30 RX ADMIN — Medication 100 MILLIGRAM(S): at 06:27

## 2019-01-30 NOTE — DISCHARGE NOTE ADULT - PATIENT PORTAL LINK FT
You can access the [facility] Portal, offered by [facility], by registering with the following website: [email or web address]

## 2019-01-30 NOTE — DISCHARGE NOTE ADULT - HOSPITAL COURSE
37 y/o Female , + Obesity,   presenting with B/L  legs pain x yesterday.    Hospital Course:     Leg pain.  Plan: R/O viral syndrome, RVP, B/L Venous Doppler legs, R/O DVT,   F/U CBC, CMP, Iron studies, ferritin,  Vit. B12, Folate,  Pain Control, IV Morphine PRN, colace, Senna, IVF NS @ 75 cc/hr x 24 HR, Bacid,   PT Consult,  Fall/aspiration precaution,        · Cough.  Plan: Chest X ray, RVP, Blood Cultures, sputum culture, Urine Legionella,  F/U CBC, CMP, + Leukocytosis, pt has been on PO Prednisone at home,   R/O Viral syndrome, R/O Pneumonia, HIV Test,   Constipation.  Plan: Colace,  Senna.     : Preventive measure.  Plan: DVT Prophylaxis: SQ Lovenox, PT, PTT, INR,    Hep A,B,C  profile, Fasting lipid, HgbA1c,   PT consult,    Dispo- Home with outpatient Physical Therapy 38F with obesity presents with b/l LE pain, concerning for lumbar radiculopathy.  The patient had no alarm symptoms to suggest acute cord compression.  On day of discharge, patient was able to ambulate and PT evaluated with no skilled PT needs.  Pt was started on trial of neurontin and will f/u with PMD.

## 2019-01-30 NOTE — DISCHARGE NOTE ADULT - MEDICATION SUMMARY - MEDICATIONS TO STOP TAKING
I will STOP taking the medications listed below when I get home from the hospital:    cetirizine 10 mg oral tablet  -- 1 tab(s) by mouth once a day    predniSONE 10 mg oral tablet  -- 2 tab(s) by mouth 2 times a day for 3 days   (today was supposed to be the last day of of taper, however pt did not take any medication today)

## 2019-01-30 NOTE — PROGRESS NOTE ADULT - PROBLEM SELECTOR PLAN 2
- Would not initiate statin therapy at this time, no other CAD risk factors or medical history  - Pt instructed to f/u with PMD and to monitor lipid profile  - Encouraged diet and exercise

## 2019-01-30 NOTE — PROGRESS NOTE ADULT - PROBLEM SELECTOR PLAN 1
- Likely lumbar radiculopathy, encouraged outpatient PT, Motrin PRN every 6-8 hrs and trial of Neurontin, warm compresses  - Also counseled patient on weight loss and diet  - No alarm symptoms to suggest cord compression or need for urgent imaging.

## 2019-01-30 NOTE — PROGRESS NOTE ADULT - SUBJECTIVE AND OBJECTIVE BOX
Patient is a 38y old  Female who presents with a chief complaint of Legs pain, dry Cough, (2019 12:12)      SUBJECTIVE / OVERNIGHT EVENTS: Pt states that pain is better controlled after receiving few doses of IV morphine, but is afraid that pain will come back once she's off of pain meds.  She was able to stand and walk for me, though gingerly.  Describes shooting/lightning pains down her back that seems to start in her L hip.  She is anxious to go home.  Denies urinary/stool incontinence or saddle anesthesia       MEDICATIONS  (STANDING):  docusate sodium 100 milliGRAM(s) Oral three times a day  enoxaparin Injectable 40 milliGRAM(s) SubCutaneous daily  gabapentin 100 milliGRAM(s) Oral three times a day  influenza   Vaccine 0.5 milliLiter(s) IntraMuscular once  lactobacillus acidophilus 1 Tablet(s) Oral every 12 hours  senna 2 Tablet(s) Oral at bedtime  sodium chloride 0.9%. 1000 milliLiter(s) (75 mL/Hr) IV Continuous <Continuous>    MEDICATIONS  (PRN):  ibuprofen  Tablet. 600 milliGRAM(s) Oral every 6 hours PRN Mild Pain (1 - 3), Moderate Pain (4 - 6), Severe Pain (7 - 10)      Vital Signs Last 24 Hrs  T(C): 37.1 (2019 05:44), Max: 37.7 (2019 19:49)  T(F): 98.7 (2019 05:44), Max: 99.8 (2019 19:49)  HR: 74 (2019 05:44) (74 - 95)  BP: 120/65 (2019 05:44) (106/62 - 122/62)  BP(mean): --  RR: 18 (2019 05:44) (16 - 18)  SpO2: 99% (2019 05:44) (99% - 100%)  CAPILLARY BLOOD GLUCOSE      PHYSICAL EXAM  GENERAL: NAD, well-developed, obese  HEAD:  Atraumatic, Normocephalic  EYES: EOMI, PERRLA, conjunctiva and sclera clear  NECK: Supple, No JVD  CHEST/LUNG: Clear to auscultation bilaterally; No wheeze  HEART: Regular rate and rhythm; No murmurs, rubs, or gallops  ABDOMEN: Soft, Nontender, Nondistended; Bowel sounds present  EXTREMITIES:  2+ Peripheral Pulses, No clubbing, cyanosis, or edema  NEURO: +straight leg raise test in LLE   PSYCH: AAOx3  SKIN: No rashes or lesions    LABS:                        10.9   12.21 )-----------( 332      ( 2019 09:50 )             34.6         137  |  102  |  12  ----------------------------<  99  4.4   |  25  |  0.68    Ca    8.5      2019 09:50  Phos  3.1       Mg     2.1         TPro  6.0  /  Alb  3.6  /  TBili  0.8  /  DBili  x   /  AST  13  /  ALT  16  /  AlkPhos  82        CARDIAC MARKERS ( 2019 15:55 )  x     / x     / 47 u/L / x     / x          Urinalysis Basic - ( 2019 15:50 )    Color: LIGHT YELLOW / Appearance: CLEAR / S.020 / pH: 6.0  Gluc: NEGATIVE / Ketone: NEGATIVE  / Bili: NEGATIVE / Urobili: NORMAL   Blood: NEGATIVE / Protein: NEGATIVE / Nitrite: NEGATIVE   Leuk Esterase: NEGATIVE / RBC: x / WBC x   Sq Epi: x / Non Sq Epi: x / Bacteria: x    Sedimentation Rate, Erythrocyte (19 @ 18:30)    Sedimentation Rate, Erythrocyte: 14 mm/hr    C-Reactive Protein, Serum (19 @ 15:32)    C-Reactive Protein, Serum: < 4.0 mg/L    Lipid Profile in AM (19 @ 09:50)    Cholesterol, Serum: 153 mg/dL    Triglycerides, Serum: 340 mg/dL    HDL Cholesterol, Serum: 36 mg/dL    Direct LDL: 84:   RESULT (mg/dL)   (For adults 18 years and older)  ----------------------------------------------------------  Below 70         Ideal for people at very high risk of  heart disease  Below 100        Ideal for people at risk of heart disease  100 - 129        [place]  130 - 159        Borderline high  160 - 189        High  190 and above    Very high mg/dL

## 2019-01-30 NOTE — DISCHARGE NOTE ADULT - CARE PLAN
Principal Discharge DX:	Leg pain  Goal:	Decreased pain level  Assessment and plan of treatment:	Follow up with outpatient physical therapy for gait stability and strength.  Lower leg doppler study performed and negative for deep vein thrombosis.   Continue Neurontin for pain as prescribed.    Follow up with your PCP within 1 week of discharge from hospital.  Secondary Diagnosis:	Constipation  Assessment and plan of treatment:	Continue stool softeners as recommended  Secondary Diagnosis:	Cough  Assessment and plan of treatment:	Chest xray clear.  Follow up with your PCP within 1 week of discharge.

## 2019-01-30 NOTE — PROGRESS NOTE ADULT - PROBLEM SELECTOR PLAN 3
yes Dispo: Medically stable for d/c home today   D/C time: 31 minutes including discussion of care with pt's family

## 2019-01-30 NOTE — DISCHARGE NOTE ADULT - CARE PROVIDER_API CALL
Juliana Bedolla)  Internal Medicine  [address], [address]  [place] [address]  Phone: [phone]  Fax: [phone]

## 2019-01-30 NOTE — DISCHARGE NOTE ADULT - PLAN OF CARE
Decreased pain level Follow up with outpatient physical therapy for gait stability and strength.  Lower leg doppler study performed and negative for deep vein thrombosis.   Continue Neurontin for pain as prescribed.    Follow up with your PCP within 1 week of discharge from hospital. Continue stool softeners as recommended Chest xray clear.  Follow up with your PCP within 1 week of discharge.

## 2019-01-30 NOTE — DISCHARGE NOTE ADULT - MEDICATION SUMMARY - MEDICATIONS TO TAKE
I will START or STAY ON the medications listed below when I get home from the hospital:    ibuprofen 600 mg oral tablet  -- 1 tab(s) by mouth every 6 hours, As needed, Mild Pain (1 - 3), Moderate Pain (4 - 6), Severe Pain (7 - 10)  -- Indication: For Pain of lower extremity    gabapentin 100 mg oral capsule  -- 1 cap(s) by mouth 3 times a day  -- Indication: For Pain of lower extremity    senna oral tablet  -- 2 tab(s) by mouth once a day (at bedtime)  -- Indication: For Constipation    docusate sodium 100 mg oral capsule  -- 1 cap(s) by mouth 3 times a day  -- Indication: For Constipation    Singulair 10 mg oral tablet  -- 1 tab(s) by mouth once a day  -- Indication: For asthma    Vitamin D2 50,000 intl units (1.25 mg) oral capsule  -- 1 cap(s) by mouth once a week on Monday  -- Indication: For Prophylactic

## 2019-01-31 LAB
BACTERIA UR CULT: SIGNIFICANT CHANGE UP
SPECIMEN SOURCE: SIGNIFICANT CHANGE UP
SPECIMEN SOURCE: SIGNIFICANT CHANGE UP

## 2019-02-04 LAB — BACTERIA BLD CULT: SIGNIFICANT CHANGE UP

## 2021-05-19 PROBLEM — E66.9 OBESITY, UNSPECIFIED: Chronic | Status: ACTIVE | Noted: 2019-01-29

## 2021-05-28 ENCOUNTER — RESULT REVIEW (OUTPATIENT)
Age: 41
End: 2021-05-28

## 2021-05-28 ENCOUNTER — APPOINTMENT (OUTPATIENT)
Dept: MAMMOGRAPHY | Facility: IMAGING CENTER | Age: 41
End: 2021-05-28
Payer: MEDICAID

## 2021-05-28 ENCOUNTER — TRANSCRIPTION ENCOUNTER (OUTPATIENT)
Age: 41
End: 2021-05-28

## 2021-05-28 ENCOUNTER — OUTPATIENT (OUTPATIENT)
Dept: OUTPATIENT SERVICES | Facility: HOSPITAL | Age: 41
LOS: 1 days | End: 2021-05-28
Payer: MEDICAID

## 2021-05-28 DIAGNOSIS — Z98.891 HISTORY OF UTERINE SCAR FROM PREVIOUS SURGERY: Chronic | ICD-10-CM

## 2021-05-28 DIAGNOSIS — Z00.8 ENCOUNTER FOR OTHER GENERAL EXAMINATION: ICD-10-CM

## 2021-05-28 PROCEDURE — 77067 SCR MAMMO BI INCL CAD: CPT

## 2021-05-28 PROCEDURE — 77063 BREAST TOMOSYNTHESIS BI: CPT

## 2021-05-28 PROCEDURE — 77067 SCR MAMMO BI INCL CAD: CPT | Mod: 26

## 2021-05-28 PROCEDURE — 77063 BREAST TOMOSYNTHESIS BI: CPT | Mod: 26

## 2021-08-25 NOTE — PATIENT PROFILE ADULT - BRADEN NUTRITION

## 2023-03-30 ENCOUNTER — RESULT REVIEW (OUTPATIENT)
Age: 43
End: 2023-03-30

## 2023-03-30 ENCOUNTER — OUTPATIENT (OUTPATIENT)
Dept: OUTPATIENT SERVICES | Facility: HOSPITAL | Age: 43
LOS: 1 days | End: 2023-03-30
Payer: MEDICAID

## 2023-03-30 ENCOUNTER — APPOINTMENT (OUTPATIENT)
Dept: MAMMOGRAPHY | Facility: IMAGING CENTER | Age: 43
End: 2023-03-30
Payer: MEDICAID

## 2023-03-30 DIAGNOSIS — Z00.8 ENCOUNTER FOR OTHER GENERAL EXAMINATION: ICD-10-CM

## 2023-03-30 DIAGNOSIS — Z98.891 HISTORY OF UTERINE SCAR FROM PREVIOUS SURGERY: Chronic | ICD-10-CM

## 2023-03-30 PROCEDURE — 77067 SCR MAMMO BI INCL CAD: CPT

## 2023-03-30 PROCEDURE — 77063 BREAST TOMOSYNTHESIS BI: CPT

## 2023-03-30 PROCEDURE — 77063 BREAST TOMOSYNTHESIS BI: CPT | Mod: 26

## 2023-03-30 PROCEDURE — 77067 SCR MAMMO BI INCL CAD: CPT | Mod: 26

## 2023-09-07 ENCOUNTER — APPOINTMENT (OUTPATIENT)
Age: 43
End: 2023-09-07

## 2023-12-13 ENCOUNTER — APPOINTMENT (OUTPATIENT)
Age: 43
End: 2023-12-13
Payer: COMMERCIAL

## 2023-12-13 PROCEDURE — D2392: CPT

## 2023-12-21 ENCOUNTER — APPOINTMENT (OUTPATIENT)
Age: 43
End: 2023-12-21
Payer: COMMERCIAL

## 2023-12-21 PROCEDURE — D2393: CPT

## 2024-02-07 ENCOUNTER — APPOINTMENT (OUTPATIENT)
Age: 44
End: 2024-02-07

## 2024-03-13 ENCOUNTER — APPOINTMENT (OUTPATIENT)
Age: 44
End: 2024-03-13
Payer: COMMERCIAL

## 2024-03-13 PROCEDURE — D2392: CPT

## 2024-04-18 ENCOUNTER — APPOINTMENT (OUTPATIENT)
Age: 44
End: 2024-04-18
Payer: COMMERCIAL

## 2024-04-18 PROCEDURE — D3320: CPT

## 2024-04-25 ENCOUNTER — APPOINTMENT (OUTPATIENT)
Age: 44
End: 2024-04-25

## 2024-05-02 ENCOUNTER — APPOINTMENT (OUTPATIENT)
Age: 44
End: 2024-05-02
Payer: COMMERCIAL

## 2024-05-02 PROCEDURE — EIP: CUSTOM

## 2024-05-16 ENCOUNTER — APPOINTMENT (OUTPATIENT)
Age: 44
End: 2024-05-16
Payer: COMMERCIAL

## 2024-05-16 PROCEDURE — D2950: CPT

## 2024-06-06 ENCOUNTER — APPOINTMENT (OUTPATIENT)
Age: 44
End: 2024-06-06

## 2024-06-06 PROCEDURE — D9110: CPT

## 2024-06-13 ENCOUNTER — APPOINTMENT (OUTPATIENT)
Age: 44
End: 2024-06-13

## 2024-06-24 ENCOUNTER — APPOINTMENT (OUTPATIENT)
Age: 44
End: 2024-06-24

## 2024-06-24 PROCEDURE — D7140: CPT

## 2024-07-10 ENCOUNTER — APPOINTMENT (OUTPATIENT)
Age: 44
End: 2024-07-10
Payer: COMMERCIAL

## 2024-07-12 PROCEDURE — D0140: CPT

## 2024-09-04 ENCOUNTER — APPOINTMENT (OUTPATIENT)
Age: 44
End: 2024-09-04
Payer: MEDICAID

## 2024-09-04 PROCEDURE — D2752: CPT

## 2024-09-25 ENCOUNTER — APPOINTMENT (OUTPATIENT)
Age: 44
End: 2024-09-25
Payer: MEDICAID

## 2024-09-25 PROCEDURE — PIP: CUSTOM

## 2024-09-27 ENCOUNTER — APPOINTMENT (OUTPATIENT)
Age: 44
End: 2024-09-27

## 2024-10-23 ENCOUNTER — APPOINTMENT (OUTPATIENT)
Age: 44
End: 2024-10-23
Payer: MEDICAID

## 2024-10-23 PROCEDURE — PIP: CUSTOM

## 2024-10-30 ENCOUNTER — APPOINTMENT (OUTPATIENT)
Age: 44
End: 2024-10-30
Payer: MEDICAID

## 2024-10-30 PROCEDURE — PIP: CUSTOM

## 2024-12-05 ENCOUNTER — APPOINTMENT (OUTPATIENT)
Age: 44
End: 2024-12-05
Payer: MEDICAID

## 2024-12-05 PROCEDURE — INCR: CUSTOM

## 2024-12-10 ENCOUNTER — APPOINTMENT (OUTPATIENT)
Age: 44
End: 2024-12-10

## 2024-12-17 ENCOUNTER — APPOINTMENT (OUTPATIENT)
Age: 44
End: 2024-12-17
Payer: MEDICAID

## 2024-12-17 PROCEDURE — SCREENING: CUSTOM

## 2025-03-05 NOTE — PATIENT PROFILE ADULT - NSPROMUTINFOINDIVIDFT_GEN_A_NUR
Attempted to contact patient to relay MD directive, left VM to return call to clinic.  Preferred pharmacy needed, new Rx loaded for Zoloft 25 mg daily.        March 5, 2025  Tc Plata MD   to ARMANDO Plata Psy Nurse [facility]       3/5/25  9:54 AM  We can go ahead and resume Zoloft 25 mg daily and I will discuss dosage strategy at patient's upcoming appointment.  Thank you very much.   pt want to know why her leg hurt so much